# Patient Record
Sex: MALE | Race: WHITE | NOT HISPANIC OR LATINO | Employment: OTHER | URBAN - METROPOLITAN AREA
[De-identification: names, ages, dates, MRNs, and addresses within clinical notes are randomized per-mention and may not be internally consistent; named-entity substitution may affect disease eponyms.]

---

## 2021-06-02 ENCOUNTER — EVALUATION (OUTPATIENT)
Dept: PHYSICAL THERAPY | Facility: REHABILITATION | Age: 66
End: 2021-06-02
Payer: COMMERCIAL

## 2021-06-02 ENCOUNTER — TRANSCRIBE ORDERS (OUTPATIENT)
Dept: PHYSICAL THERAPY | Facility: REHABILITATION | Age: 66
End: 2021-06-02

## 2021-06-02 DIAGNOSIS — R29.898 SHOULDER WEAKNESS: ICD-10-CM

## 2021-06-02 DIAGNOSIS — M76.31 IT BAND SYNDROME, RIGHT: Primary | ICD-10-CM

## 2021-06-02 DIAGNOSIS — M25.551 RIGHT HIP PAIN: ICD-10-CM

## 2021-06-02 PROCEDURE — 97162 PT EVAL MOD COMPLEX 30 MIN: CPT | Performed by: PHYSICAL THERAPIST

## 2021-06-02 RX ORDER — AMLODIPINE BESYLATE AND ATORVASTATIN CALCIUM 5; 10 MG/1; MG/1
1 TABLET, FILM COATED ORAL DAILY
COMMUNITY

## 2021-06-02 RX ORDER — IRBESARTAN 75 MG/1
75 TABLET ORAL
COMMUNITY

## 2021-06-02 NOTE — PROGRESS NOTES
PT Evaluation     Today's date: 2021  Patient name: Adela Thompson  : 1955  MRN: [id number]  Referring provider: Rober Farley DO  Dx:   Encounter Diagnosis     ICD-10-CM    1  It band syndrome, right  M76 31    2  Right hip pain  M25 551    3  Shoulder weakness  R29 898        Start Time: 905  Stop Time: 945  Total time in clinic (min): 40 minutes    Assessment  Assessment details: Patient is a 60 y o  male that presents with right shoulder weakness and right leg/hip pain  Patient presents with decreased strength, decreased ROM, and tenderness to palpation  Patient has difficulty with prolonged standing, ambulation, negotiation of stairs, and reaching overhead secondary to impairments  Patient would benefit from skilled physical therapy services to address impairments to maximize function  Impairments: abnormal or restricted ROM, activity intolerance, impaired physical strength, lacks appropriate home exercise program and pain with function  Understanding of Dx/Px/POC: good   Prognosis: fair  Prognosis details: Chronic symptoms    Goals  Impairment:  1  Patient will reports 50% reduction in pain in 4 weeks to maximize function  2   Patient will improve strength to 4/5 in all planes to maximize function  3   Patient will improve ROM to [facility] in 4 weeks to maximize function  Functional:  1  Patient will improve FOTO by 20 points to 72/100 in 4 weeks to maximize function  2  Patient will be independent with HEP in 4 weeks to maximize function  3  Patient will report no difficulty with reaching an overhead shelf in 4 weeks to maximize function  4  Patient will report no difficulty with walking one mile in 4 weeks to maximize function  5  Patient will report no difficulty with negotiation of stairs in 4 weeks to maximize function  Plan  Plan details: Patient will be a RE in 4 weeks      Patient would benefit from: skilled physical therapy  Planned modality interventions: cryotherapy and thermotherapy: hydrocollator packs  Planned therapy interventions: abdominal trunk stabilization, activity modification, manual therapy, patient education, postural training, neuromuscular re-education, home exercise program, functional ROM exercises, therapeutic exercise, therapeutic activities, strengthening and balance  Frequency: 1x week  Duration in visits: 5  Duration in weeks: 4  Treatment plan discussed with: patient        Subjective Evaluation    History of Present Illness  Mechanism of injury: Patient presents with right shoulder weakness for about 20 years  He also presents with right lateral upper leg pain and lower leg numbness and weakness in his lower leg  Patient reports the leg pain has been going on for about 3-4 months  He had one fall about 3 weeks ago when his leg went numb  Patient reports his leg numbness is worst with prolonged standing  He reports difficulty with reaching overhead and overall strength  He has been having numbness and tingling into his right UE for about a year  Patient reports no cervical or LBP  Patient reports difficulty with prolonged standing, ambulation, and negotiation of stairs  He goes slower with his house/yard work  Pain  At best pain ratin  At worst pain ratin  Location: lateral R leg  Quality: dull ache  Aggravating factors: stair climbing, walking, standing and overhead activity  Progression: no change    Hand dominance: right      Diagnostic Tests  No diagnostic tests performed  Treatments  Current treatment: physical therapy  Patient Goals  Patient goals for therapy: increased strength, decreased pain, improved balance and increased motion          Objective     Static Posture     Head  Forward  Shoulders  Rounded  Postural Observations  Seated posture: fair  Standing posture: fair        Palpation   Left   No palpable tenderness to the lumbar paraspinals and quadratus lumborum       Right   No palpable tenderness to the levator scapulae, lumbar paraspinals, middle trapezius, piriformis, quadratus lumborum, rhomboids and upper trapezius  Tenderness of the gluteus medius  Tenderness     Right Shoulder  Tenderness in the biceps tendon (proximal) and bicipital groove  No tenderness in the coracoid process, scapular spine, subacromial bursa and subscapularis tendon  Lumbar Spine  No tenderness in the spinous process  Left Hip   No tenderness in the PSIS  Right Hip   Tenderness in the greater trochanter  No tenderness in the PSIS and sacroiliac joint  Right Knee   Tenderness in the fibular head and ITB  Active Range of Motion   Left Shoulder   Flexion: 126 degrees   Abduction: 130 degrees   Internal rotation BTB: T10     Right Shoulder   Flexion: 122 degrees   Abduction: 125 degrees   Internal rotation BTB: T10     Lumbar   Flexion:  WFL  Extension:  WFL  Left lateral flexion:  WFL  Right lateral flexion:  WFL    Passive Range of Motion     Right Shoulder   External rotation 90°: 60 degrees with pain  Internal rotation 90°: WFL  Left Hip   Flexion: [facility] [place]  External rotation (90/90): [facility] [place]  Internal rotation (90/90): WFL    Right Hip   Flexion: [facility] [place]  External rotation (90/90):  [facility] [place]  Internal rotation (90/90): 15 degrees with pain    Strength/Myotome Testing     Left Shoulder     Planes of Motion   Flexion: 4   Abduction: 4     Right Shoulder     Planes of Motion   Flexion: 3+   Abduction: 4-   External rotation at 90°: 4   Internal rotation at 90°: 4+     Left Hip   Planes of Motion   Flexion: 4  Abduction: 4  External rotation: 4+  Internal rotation: 4+    Right Hip   Planes of Motion   Flexion: 4  Abduction: 3+  External rotation: 4  Internal rotation: 4    Left Knee   Flexion: 4+  Extension: 4    Right Knee   Flexion: 4  Extension: 4    Right Ankle/Foot   Dorsiflexion: 5  Inversion: 5  Eversion: 5    Tests     Lumbar     Left   Negative crossed SLR, passive SLR and slump test      Right   Negative crossed SLR, passive SLR and slump test      Left Hip   Negative LEANDRA and FADIR  Right Hip   Negative LEANDRA and FADIR  Ambulation     Observational Gait   Gait: within functional limits   Walking speed and stride length within functional limits       Functional Assessment        Single Leg Stance   Left: 30 seconds  Right: 30 seconds    Comments  Tandem stance: 30 seconds marily  Balance- feet together on foam eyes closed: 30 seconds             Precautions: hx HTN, depression      Manuals 6/2                                                                Neuro Re-Ed             Abdominal bracing             scapular retraction issued            Glut set issued            SLS             sidestepping                                       Ther Ex             Recumbent bike             SLR issued            Shoulder isometrics issued 5" 5x each R            sidelying hip abduction             pulleys             Wall slide scaption                                       Ther Activity                                       Gait Training                                       Modalities             Ice PRN

## 2021-06-02 NOTE — LETTER
2021    Isela Hernandes DO  [address] [place] [address]    Patient: Torrie Linares   YOB: 1955   Date of Visit: 2021     Encounter Diagnosis     ICD-10-CM    1  It band syndrome, right  M76 31    2  Right hip pain  M25 551    3  Shoulder weakness  R29 898        Dear Dr Hernández President: Thank you for your recent referral of Torrie Linares  Please review the attached evaluation summary from Gregg's recent visit  Please verify that you agree with the plan of care by signing the attached order  If you have any questions or concerns, please do not hesitate to call  I sincerely appreciate the opportunity to share in the care of one of your patients and hope to have another opportunity to work with you in the near future  Sincerely,    Lilia Palencia, PT      Referring Provider:      I certify that I have read the below Plan of Care and certify the need for these services furnished under this plan of treatment while under my care  Isela Hernandes DO  [facility] [address]  Via Fax: [phone]          PT Evaluation     Today's date: 2021  Patient name: Torrie Linares  : 1955  MRN: [id number]  Referring provider: Cinthya Oliveros DO  Dx:   Encounter Diagnosis     ICD-10-CM    1  It band syndrome, right  M76 31    2  Right hip pain  M25 551    3  Shoulder weakness  R29 898        Start Time: 905  Stop Time: 945  Total time in clinic (min): 40 minutes    Assessment  Assessment details: Patient is a 60 y o  male that presents with right shoulder weakness and right leg/hip pain  Patient presents with decreased strength, decreased ROM, and tenderness to palpation  Patient has difficulty with prolonged standing, ambulation, negotiation of stairs, and reaching overhead secondary to impairments  Patient would benefit from skilled physical therapy services to address impairments to maximize function      Impairments: abnormal or restricted ROM, activity intolerance, impaired physical strength, lacks appropriate home exercise program and pain with function  Understanding of Dx/Px/POC: good   Prognosis: fair  Prognosis details: Chronic symptoms    Goals  Impairment:  1  Patient will reports 50% reduction in pain in 4 weeks to maximize function  2   Patient will improve strength to 4/5 in all planes to maximize function  3   Patient will improve ROM to [facility] in 4 weeks to maximize function  Functional:  1  Patient will improve FOTO by 20 points to 72/100 in 4 weeks to maximize function  2  Patient will be independent with HEP in 4 weeks to maximize function  3  Patient will report no difficulty with reaching an overhead shelf in 4 weeks to maximize function  4  Patient will report no difficulty with walking one mile in 4 weeks to maximize function  5  Patient will report no difficulty with negotiation of stairs in 4 weeks to maximize function  Plan  Plan details: Patient will be a RE in 4 weeks  Patient would benefit from: skilled physical therapy  Planned modality interventions: cryotherapy and thermotherapy: hydrocollator packs  Planned therapy interventions: abdominal trunk stabilization, activity modification, manual therapy, patient education, postural training, neuromuscular re-education, home exercise program, functional ROM exercises, therapeutic exercise, therapeutic activities, strengthening and balance  Frequency: 1x week  Duration in visits: 5  Duration in weeks: 4  Treatment plan discussed with: patient        Subjective Evaluation    History of Present Illness  Mechanism of injury: Patient presents with right shoulder weakness for about 20 years  He also presents with right lateral upper leg pain and lower leg numbness and weakness in his lower leg  Patient reports the leg pain has been going on for about 3-4 months  He had one fall about 3 weeks ago when his leg went numb    Patient reports his leg numbness is worst with prolonged standing  He reports difficulty with reaching overhead and overall strength  He has been having numbness and tingling into his right UE for about a year  Patient reports no cervical or LBP  Patient reports difficulty with prolonged standing, ambulation, and negotiation of stairs  He goes slower with his house/yard work  Pain  At best pain ratin  At worst pain ratin  Location: lateral R leg  Quality: dull ache  Aggravating factors: stair climbing, walking, standing and overhead activity  Progression: no change    Hand dominance: right      Diagnostic Tests  No diagnostic tests performed  Treatments  Current treatment: physical therapy  Patient Goals  Patient goals for therapy: increased strength, decreased pain, improved balance and increased motion          Objective     Static Posture     Head  Forward  Shoulders  Rounded  Postural Observations  Seated posture: fair  Standing posture: fair        Palpation   Left   No palpable tenderness to the lumbar paraspinals and quadratus lumborum  Right   No palpable tenderness to the levator scapulae, lumbar paraspinals, middle trapezius, piriformis, quadratus lumborum, rhomboids and upper trapezius  Tenderness of the gluteus medius  Tenderness     Right Shoulder  Tenderness in the biceps tendon (proximal) and bicipital groove  No tenderness in the coracoid process, scapular spine, subacromial bursa and subscapularis tendon  Lumbar Spine  No tenderness in the spinous process  Left Hip   No tenderness in the PSIS  Right Hip   Tenderness in the greater trochanter  No tenderness in the PSIS and sacroiliac joint  Right Knee   Tenderness in the fibular head and ITB       Active Range of Motion   Left Shoulder   Flexion: 126 degrees   Abduction: 130 degrees   Internal rotation BTB: T10     Right Shoulder   Flexion: 122 degrees   Abduction: 125 degrees   Internal rotation BTB: T10     Lumbar   Flexion:  WFL  Extension: WFL  Left lateral flexion:  WFL  Right lateral flexion:  WFL    Passive Range of Motion     Right Shoulder   External rotation 90°: 60 degrees with pain  Internal rotation 90°: WFL  Left Hip   Flexion: [facility] [place]  External rotation (90/90): [facility] [place]  Internal rotation (90/90): WFL    Right Hip   Flexion: [facility] [place]  External rotation (90/90): [facility] [place]  Internal rotation (90/90): 15 degrees with pain    Strength/Myotome Testing     Left Shoulder     Planes of Motion   Flexion: 4   Abduction: 4     Right Shoulder     Planes of Motion   Flexion: 3+   Abduction: 4-   External rotation at 90°: 4   Internal rotation at 90°: 4+     Left Hip   Planes of Motion   Flexion: 4  Abduction: 4  External rotation: 4+  Internal rotation: 4+    Right Hip   Planes of Motion   Flexion: 4  Abduction: 3+  External rotation: 4  Internal rotation: 4    Left Knee   Flexion: 4+  Extension: 4    Right Knee   Flexion: 4  Extension: 4    Right Ankle/Foot   Dorsiflexion: 5  Inversion: 5  Eversion: 5    Tests     Lumbar     Left   Negative crossed SLR, passive SLR and slump test      Right   Negative crossed SLR, passive SLR and slump test      Left Hip   Negative LEANDRA and FADIR  Right Hip   Negative LEANDRA and FADIR  Ambulation     Observational Gait   Gait: within functional limits   Walking speed and stride length within functional limits       Functional Assessment        Single Leg Stance   Left: 30 seconds  Right: 30 seconds    Comments  Tandem stance: 30 seconds marily  Balance- feet together on foam eyes closed: 30 seconds             Precautions: hx HTN, depression      Manuals 6/2                                                                Neuro Re-Ed             Abdominal bracing             scapular retraction issued            Glut set issued            SLS             sidestepping                                       Ther Ex             Recumbent bike             SLR issued            Shoulder isometrics issued 5" 5x each R            sidelying hip abduction             pulleys             Wall slide scaption                                       Ther Activity                                       Gait Training                                       Modalities             Ice PRN

## 2021-06-09 ENCOUNTER — OFFICE VISIT (OUTPATIENT)
Dept: PHYSICAL THERAPY | Facility: REHABILITATION | Age: 66
End: 2021-06-09
Payer: COMMERCIAL

## 2021-06-09 DIAGNOSIS — R29.898 SHOULDER WEAKNESS: ICD-10-CM

## 2021-06-09 DIAGNOSIS — M25.551 RIGHT HIP PAIN: ICD-10-CM

## 2021-06-09 DIAGNOSIS — M76.31 IT BAND SYNDROME, RIGHT: Primary | ICD-10-CM

## 2021-06-09 PROCEDURE — 97112 NEUROMUSCULAR REEDUCATION: CPT

## 2021-06-09 PROCEDURE — 97110 THERAPEUTIC EXERCISES: CPT

## 2021-06-09 NOTE — PROGRESS NOTES
Daily Note     Today's date: 2021  Patient name: Maisha Cabrera  : 1955  MRN: [id number]  Referring provider: Chari Lopez DO  Dx:   Encounter Diagnosis     ICD-10-CM    1  It band syndrome, right  M76 31    2  Right hip pain  M25 551    3  Shoulder weakness  R29 898        Start Time: 1035  Stop Time: 1120  Total time in clinic (min): 45 minutes    Subjective: Pt reports compliance with HEP issued at evaluation and using ice after exercise as instructed  Pt reports my pain is mostly in my R hip  Pt also notes difficulty getting dishes into a cabinet above shoulder height  Objective: See treatment diary below    Precautions: hx HTN, depression      Manuals                                                                Neuro Re-Ed             Abdominal bracing  5"x10           scapular retraction issued nv           Glut set issued 5"x10           SLS  nv           sidestepping  x2 ea                                     Ther Ex             Recumbent bike  5 min           SLR issued 5"x10 R           Shoulder isometrics issued 5" 5x each R 5"x5 ea R           sidelying hip abduction  5"x10 R           pulleys  3 min           Wall slide scaption  x10                                     Ther Activity                                       Gait Training                                       Modalities             Ice PRN                            Assessment: Pt presents to PT for first visit since initial evaluation  Initiated TE as noted per PT POC  Tolerated treatment well  Good ROM achieved with pulleys, but is more restricted in ROM due to tightness with wall slides and fatigues with shoulder isometrics  R hip fatigue with side stepping and mat table hip strengthening  Pt was educated in possible soreness, deferred ice to perform at home  Patient would benefit from continued PT to improve strength, ROM and maximize overall level of function  Plan: Progress treatment as tolerated

## 2021-06-16 ENCOUNTER — APPOINTMENT (OUTPATIENT)
Dept: PHYSICAL THERAPY | Facility: REHABILITATION | Age: 66
End: 2021-06-16
Payer: COMMERCIAL

## 2021-06-23 ENCOUNTER — OFFICE VISIT (OUTPATIENT)
Dept: PHYSICAL THERAPY | Facility: REHABILITATION | Age: 66
End: 2021-06-23
Payer: COMMERCIAL

## 2021-06-23 DIAGNOSIS — M76.31 IT BAND SYNDROME, RIGHT: Primary | ICD-10-CM

## 2021-06-23 DIAGNOSIS — M25.551 RIGHT HIP PAIN: ICD-10-CM

## 2021-06-23 DIAGNOSIS — R29.898 SHOULDER WEAKNESS: ICD-10-CM

## 2021-06-23 PROCEDURE — 97110 THERAPEUTIC EXERCISES: CPT | Performed by: PHYSICAL THERAPY ASSISTANT

## 2021-06-23 PROCEDURE — 97112 NEUROMUSCULAR REEDUCATION: CPT | Performed by: PHYSICAL THERAPY ASSISTANT

## 2021-06-23 NOTE — PROGRESS NOTES
Daily Note     Today's date: 2021  Patient name: Anne Schaffer  : 1955  MRN: [id number]  Referring provider: Roseanna Fernandez DO  Dx:   Encounter Diagnosis     ICD-10-CM    1  It band syndrome, right  M76 31    2  Right hip pain  M25 551    3  Shoulder weakness  R29 898                   Subjective: Pt reports daily compliance with HEP  States he was able to go to a four day [employer] and was able to walk for 3 days before he started to have any pain  Pt reports  pain is mostly in R hip  Objective: See treatment diary below    Precautions: hx HTN, depression      Manuals                                                               Neuro Re-Ed             Abdominal bracing  5"x10 5"x15          scapular retraction issued nv 5"x10          Glut set issued 5"x10 np          SLS  nv 15"x3 ea          sidestepping  x2 ea x2 ea RTB         bridges   2x10                       Ther Ex             Recumbent bike  5 min 7 min          SLR issued 5"x10 R  2x10  R          Shoulder isometrics issued 5" 5x each R 5"x5 ea R 5"x10 ea R          sidelying hip abduction  5"x10 R 2x10 R          pulleys  3 min 3 min          Wall slide scaption  x10 x10                                     Ther Activity                                       Gait Training                                       Modalities             Ice PRN                            Assessment: Good tolerance to progression of TE as noted  Mild difficulty with wall slides and verbal cues required for good technique   Tolerated treatment well  Patient would benefit from continued PT to improve strength, ROM and maximize overall level of function  Plan: Progress treatment as tolerated

## 2021-06-30 ENCOUNTER — APPOINTMENT (OUTPATIENT)
Dept: PHYSICAL THERAPY | Facility: REHABILITATION | Age: 66
End: 2021-06-30
Payer: COMMERCIAL

## 2021-06-30 NOTE — PROGRESS NOTES
6/30/2021:  Patient self discharged secondary to co-payment concerns  FOTO improved by 5 points to 57/100  Discharged to [facility] at this time